# Patient Record
Sex: FEMALE | URBAN - METROPOLITAN AREA
[De-identification: names, ages, dates, MRNs, and addresses within clinical notes are randomized per-mention and may not be internally consistent; named-entity substitution may affect disease eponyms.]

---

## 2018-07-19 ENCOUNTER — APPOINTMENT (OUTPATIENT)
Age: 28
Setting detail: DERMATOLOGY
End: 2018-07-19

## 2018-07-19 DIAGNOSIS — L23.9 ALLERGIC CONTACT DERMATITIS, UNSPECIFIED CAUSE: ICD-10-CM

## 2018-07-19 DIAGNOSIS — B86 SCABIES: ICD-10-CM

## 2018-07-19 PROCEDURE — OTHER ADDITIONAL NOTES: OTHER

## 2018-07-19 PROCEDURE — OTHER COUNSELING: OTHER

## 2018-07-19 PROCEDURE — 99203 OFFICE O/P NEW LOW 30 MIN: CPT

## 2018-07-19 PROCEDURE — OTHER PRESCRIPTION MEDICATION MANAGEMENT: OTHER

## 2018-07-19 PROCEDURE — OTHER PRESCRIPTION: OTHER

## 2018-07-19 RX ORDER — BETAMETHASONE DIPROPIONATE 0.5 MG/G
OINTMENT TOPICAL BID
Qty: 50 | Refills: 0 | Status: ERX | COMMUNITY
Start: 2018-07-19

## 2018-07-19 RX ORDER — PERMETHRIN 50 MG/G
CREAM TOPICAL QD
Qty: 1 | Refills: 0 | Status: ERX

## 2018-07-19 RX ORDER — IVERMECTIN 3 MG/1
TABLET ORAL QD
Qty: 10 | Refills: 0 | Status: ERX

## 2018-07-19 ASSESSMENT — LOCATION SIMPLE DESCRIPTION DERM
LOCATION SIMPLE: RIGHT FOREARM
LOCATION SIMPLE: RIGHT THIGH
LOCATION SIMPLE: ABDOMEN
LOCATION SIMPLE: LEFT BUTTOCK
LOCATION SIMPLE: LEFT UPPER ARM
LOCATION SIMPLE: LEFT POSTERIOR THIGH

## 2018-07-19 ASSESSMENT — LOCATION DETAILED DESCRIPTION DERM
LOCATION DETAILED: LEFT LATERAL ABDOMEN
LOCATION DETAILED: LEFT PROXIMAL POSTERIOR THIGH
LOCATION DETAILED: RIGHT VENTRAL PROXIMAL FOREARM
LOCATION DETAILED: RIGHT ANTERIOR DISTAL THIGH
LOCATION DETAILED: LEFT ANTECUBITAL SKIN
LOCATION DETAILED: LEFT LATERAL BUTTOCK

## 2018-07-19 ASSESSMENT — LOCATION ZONE DERM
LOCATION ZONE: LEG
LOCATION ZONE: ARM
LOCATION ZONE: TRUNK

## 2018-07-19 NOTE — PROCEDURE: ADDITIONAL NOTES
Additional Notes: Permethrin Cream prescribed again as the patient’s second application was too early.  We will also prescribe a weight appropriate one time dose of ivermectin as well.

## 2018-07-19 NOTE — PROCEDURE: PRESCRIPTION MEDICATION MANAGEMENT
Initiate Treatment: Augmented Betamethasone
Detail Level: Zone
Initiate Treatment: Permethrin Cream \\nIvermectin

## 2019-02-18 ENCOUNTER — OFFICE VISIT (OUTPATIENT)
Dept: PODIATRY CLINIC | Facility: CLINIC | Age: 29
End: 2019-02-18
Payer: COMMERCIAL

## 2019-02-18 VITALS — RESPIRATION RATE: 22 BRPM | HEART RATE: 60 BPM | SYSTOLIC BLOOD PRESSURE: 109 MMHG | DIASTOLIC BLOOD PRESSURE: 58 MMHG

## 2019-02-18 DIAGNOSIS — M79.671 PAIN IN RIGHT FOOT: ICD-10-CM

## 2019-02-18 DIAGNOSIS — M20.11 HALLUX VALGUS OF RIGHT FOOT: ICD-10-CM

## 2019-02-18 DIAGNOSIS — D36.13 NEUROMA OF FOOT: Primary | ICD-10-CM

## 2019-02-18 PROCEDURE — 99213 OFFICE O/P EST LOW 20 MIN: CPT | Performed by: PODIATRIST

## 2019-02-18 PROCEDURE — 64455 NJX AA&/STRD PLTR COM DG NRV: CPT | Performed by: PODIATRIST

## 2019-02-18 RX ORDER — ETONOGESTREL/ETHINYL ESTRADIOL .12-.015MG
RING, VAGINAL VAGINAL
Refills: 5 | COMMUNITY
Start: 2019-02-08 | End: 2020-06-02

## 2019-02-18 RX ORDER — TRIAMCINOLONE ACETONIDE 40 MG/ML
20 INJECTION, SUSPENSION INTRA-ARTICULAR; INTRAMUSCULAR ONCE
Status: COMPLETED | OUTPATIENT
Start: 2019-02-18 | End: 2019-02-18

## 2019-02-18 RX ADMIN — TRIAMCINOLONE ACETONIDE 20 MG: 40 INJECTION, SUSPENSION INTRA-ARTICULAR; INTRAMUSCULAR at 15:39:00

## 2019-02-18 NOTE — PROGRESS NOTES
Per dr Jose Carlos Iqbal request was draw 1 syringe with 0.5ml Kenalog 40 mg and 0.5 ml Marcaine 0.5% for this patient right foot. Hannah Gr

## 2019-02-18 NOTE — PROGRESS NOTES
Precious Chaudhary is a 33year old female. Patient presents with:   Foot Pain: Right - onset about 1 week ago - no injury - has pain on the medial aspect of the foot rated as 4-7/10 at all the time, had a little numbness one day -         HPI:   This very plea Left arm)   Pulse 60   Resp 22   Physical Exam  GENERAL: well developed, well nourished, in no apparent distress  EXTREMITIES:   1.  Integument: The skin on both feet was examined it is warm, dry and supple nails are of normal thickness and appearance there DPLISE  2/18/2019

## 2019-03-06 ENCOUNTER — TELEPHONE (OUTPATIENT)
Dept: PODIATRY CLINIC | Facility: CLINIC | Age: 29
End: 2019-03-06

## 2019-03-06 NOTE — TELEPHONE ENCOUNTER
Made appt for pt w/ [facility] on 3/8 @ 235pm. N- do you have access to pt's US results? She states she had done at [address] and was not given a disc/report.

## 2019-03-06 NOTE — TELEPHONE ENCOUNTER
Dr. Magnus Olszewski is requesting a follow up with pt at [facility] location in regards to tests. Left VM for pt to call back and schedule appt. Please advise pt per Dr. Magnus Olszewski, APPT MUST BE MADE FOR Friday 3/8/19.      If no openings, please use RN slot at 2:3

## 2019-03-08 ENCOUNTER — OFFICE VISIT (OUTPATIENT)
Dept: PODIATRY CLINIC | Facility: CLINIC | Age: 29
End: 2019-03-08
Payer: COMMERCIAL

## 2019-03-08 VITALS — DIASTOLIC BLOOD PRESSURE: 70 MMHG | SYSTOLIC BLOOD PRESSURE: 119 MMHG | RESPIRATION RATE: 20 BRPM | HEART RATE: 76 BPM

## 2019-03-08 DIAGNOSIS — G57.91 NEURITIS OF FOOT, RIGHT: Primary | ICD-10-CM

## 2019-03-08 PROCEDURE — 64455 NJX AA&/STRD PLTR COM DG NRV: CPT | Performed by: PODIATRIST

## 2019-03-08 RX ORDER — TRIAMCINOLONE ACETONIDE 40 MG/ML
20 INJECTION, SUSPENSION INTRA-ARTICULAR; INTRAMUSCULAR ONCE
Status: COMPLETED | OUTPATIENT
Start: 2019-03-08 | End: 2019-03-08

## 2019-03-08 RX ADMIN — TRIAMCINOLONE ACETONIDE 20 MG: 40 INJECTION, SUSPENSION INTRA-ARTICULAR; INTRAMUSCULAR at 15:19:00

## 2019-03-08 NOTE — PROGRESS NOTES
Per Dr Haven Glynn request was draw 1 syringe with 0.5 ml Kenalog 40 mg and 0.5 ml Marcaine 0.5% for this patient right foot. Hannah Gr

## 2019-03-11 NOTE — PROGRESS NOTES
Courtney Zavala is a 33year old female. Patient presents with:   Foot Pain: Right f/u - had cortisone inj on 2/18/19 - had US done and is here to discuss results - states she had a little relief from injection - still has pain rated as 5-6/10 on and off Location: Right arm)   Pulse 76   Resp 20   Physical Exam  GENERAL: well developed, well nourished, in no apparent distress  EXTREMITIES:   1. Integument:  It was examined there is still's a little bit of edema along the medial plantar first metatarsal head

## 2019-04-03 ENCOUNTER — OFFICE VISIT (OUTPATIENT)
Dept: PODIATRY CLINIC | Facility: CLINIC | Age: 29
End: 2019-04-03
Payer: COMMERCIAL

## 2019-04-03 VITALS — HEART RATE: 78 BPM | RESPIRATION RATE: 18 BRPM | SYSTOLIC BLOOD PRESSURE: 112 MMHG | DIASTOLIC BLOOD PRESSURE: 66 MMHG

## 2019-04-03 DIAGNOSIS — M20.11 HALLUX VALGUS OF RIGHT FOOT: ICD-10-CM

## 2019-04-03 DIAGNOSIS — M79.671 PAIN IN RIGHT FOOT: ICD-10-CM

## 2019-04-03 DIAGNOSIS — Q66.70 PES CAVUS: ICD-10-CM

## 2019-04-03 DIAGNOSIS — M25.80 SESAMOIDITIS: ICD-10-CM

## 2019-04-03 DIAGNOSIS — G57.91 NEURITIS OF FOOT, RIGHT: Primary | ICD-10-CM

## 2019-04-04 NOTE — PROGRESS NOTES
Lilliam Thayer is a 33year old female. Patient presents with:   Foot Pain: Right f/u - had a cortisone inj on 3/8/19 - states she got some relief for a little bit but then pain got worse and is rated as 3-6/10 at all the time         HPI:   Right [facility] st weightbearing but on weightbearing she does not have a clinical bunion present. 2. Vascular: Patient has palpable pulses   3. Neurologic: The patient has intact sensorium   4.  Musculoskeletal: The pain that she experiences shooting pain that goes into he

## 2019-04-06 ENCOUNTER — TELEPHONE (OUTPATIENT)
Dept: PODIATRY CLINIC | Facility: CLINIC | Age: 29
End: 2019-04-06

## 2019-04-06 DIAGNOSIS — M79.2 NEURALGIA OF RIGHT FOOT: ICD-10-CM

## 2019-04-06 DIAGNOSIS — G57.91 NEURITIS OF FOOT, RIGHT: Primary | ICD-10-CM

## 2019-04-06 NOTE — TELEPHONE ENCOUNTER
Per dr Susan Jc request a new note for excuse from athletics was put in for this patient.  Hannah Gr

## 2019-04-09 NOTE — TELEPHONE ENCOUNTER
Spoke to pt and informed her of CAM boot for [facility]. Gave pt [facility] phone # and informed her that I will fax order to that location. Informed pt that, per [facility], to wear CAM boot for 1 mth.      Dr. Lucia Doran, pt is asking what to do after wearing boot f

## 2019-04-10 NOTE — TELEPHONE ENCOUNTER
Pt. States that [facility] still has not received order from our office for boot. Pt. Would like to p/up boot today.

## 2019-04-11 ENCOUNTER — TELEPHONE (OUTPATIENT)
Dept: PODIATRY CLINIC | Facility: CLINIC | Age: 29
End: 2019-04-11

## 2019-04-11 NOTE — TELEPHONE ENCOUNTER
Re-faxed DME order again - 3rd try - to [facility]. Confirmation of faxed received. LM on pt's preferred # that fax was re-sent to [facility] and confirmation of fax was received.

## 2019-04-11 NOTE — TELEPHONE ENCOUNTER
Spoke to Ramakrishna at [facility] in [facility]. Ramakrishna states they need authorized referral. Informed Karol that pt has [facility] and does not need a referral. Informed her that what they received is an order. Karol asking for pt's demographic info.  Informed her t

## 2019-04-15 ENCOUNTER — TELEPHONE (OUTPATIENT)
Dept: PODIATRY CLINIC | Facility: CLINIC | Age: 29
End: 2019-04-15

## 2019-04-15 NOTE — TELEPHONE ENCOUNTER
Call placed to [address] [place] [address] in [facility] and spoke with Christie Mcdonald regarding the need to change the CAM boot dispensed to this patient - they dispensed a short CAM boot instead of a Tall CAM boot as per prescription- she stated the patient called already a

## 2019-05-06 ENCOUNTER — PATIENT MESSAGE (OUTPATIENT)
Dept: PODIATRY CLINIC | Facility: CLINIC | Age: 29
End: 2019-05-06

## 2019-05-06 DIAGNOSIS — M77.51 CAPSULITIS OF METATARSOPHALANGEAL (MTP) JOINT OF RIGHT FOOT: Primary | ICD-10-CM

## 2019-05-06 DIAGNOSIS — M25.80 SESAMOIDITIS: ICD-10-CM

## 2019-05-06 NOTE — TELEPHONE ENCOUNTER
Please make this patient a letter to Emily alvarado stating that she cannot participate in athletic activities and training classes for another 5-week.   Thank you

## 2019-05-06 NOTE — TELEPHONE ENCOUNTER
From: Annalisa Mendes  To: Katelin Toledo DPM  Sent: 5/6/2019 1:17 PM CDT  Subject: Non-Urgent Medical Question    Hi, can I please have an updated doctor's note. Thank you!

## 2019-05-16 ENCOUNTER — OFFICE VISIT (OUTPATIENT)
Dept: PODIATRY CLINIC | Facility: CLINIC | Age: 29
End: 2019-05-16
Payer: COMMERCIAL

## 2019-05-16 ENCOUNTER — HOSPITAL ENCOUNTER (OUTPATIENT)
Dept: GENERAL RADIOLOGY | Age: 29
Discharge: HOME | End: 2019-05-16
Attending: PODIATRIST
Payer: COMMERCIAL

## 2019-05-16 ENCOUNTER — TELEPHONE (OUTPATIENT)
Dept: PODIATRY CLINIC | Facility: CLINIC | Age: 29
End: 2019-05-16

## 2019-05-16 DIAGNOSIS — M87.00 AVN (AVASCULAR NECROSIS OF BONE) (HCC): ICD-10-CM

## 2019-05-16 DIAGNOSIS — M20.11 HALLUX VALGUS OF RIGHT FOOT: ICD-10-CM

## 2019-05-16 DIAGNOSIS — M79.2 NEURALGIA OF RIGHT FOOT: ICD-10-CM

## 2019-05-16 DIAGNOSIS — M25.80 SESAMOIDITIS: ICD-10-CM

## 2019-05-16 DIAGNOSIS — D36.13 NEUROMA OF FOOT: ICD-10-CM

## 2019-05-16 PROCEDURE — 73630 X-RAY EXAM OF FOOT: CPT | Performed by: PODIATRIST

## 2019-05-16 NOTE — TELEPHONE ENCOUNTER
Dr Esau Keller ordered pt a MRI right foot with and without contrast today at pt's office visit. Pt has [employer] and will need a PA. Informed pt of this and that this may take up to 5 - 10 business days.  Informed pt that we will call her on her cell # [phone]

## 2019-05-16 NOTE — PROGRESS NOTES
Ignacio Coleman is a 33year old female. Patient presents with: Foot Pain: right -- States foot medial side of foot had bruising and pain this week. States foot has numbness in forefoot while driving. Rates pain 3/10 and describes this as discomfort.  Bay Mcdonald visit.  Physical Exam  GENERAL: well developed, well nourished, in no apparent distress  EXTREMITIES:   1. Integument: The skin on her right foot was examined it is warm dry supple. 2. Vascular: Patient has palpable pulses on the right foot   3.  Neurolog

## 2019-05-21 NOTE — TELEPHONE ENCOUNTER
Pt authorization number:  [id number]  Authorization approval dates: 5/21/2019 until 7/5/2019  Informed Pt. of PA decision with exp. date of approved procedure:[phone] (M)  LM informing pt that testing approved by insurance and, provided phone number

## 2019-05-24 ENCOUNTER — HOSPITAL ENCOUNTER (OUTPATIENT)
Dept: MRI IMAGING | Facility: HOSPITAL | Age: 29
Discharge: HOME | End: 2019-05-24
Attending: PODIATRIST
Payer: COMMERCIAL

## 2019-05-24 DIAGNOSIS — M87.00 AVN (AVASCULAR NECROSIS OF BONE) (HCC): ICD-10-CM

## 2019-05-24 PROCEDURE — 73720 MRI LWR EXTREMITY W/O&W/DYE: CPT | Performed by: PODIATRIST

## 2019-05-24 PROCEDURE — A9575 INJ GADOTERATE MEGLUMI 0.1ML: HCPCS | Performed by: PODIATRIST

## 2019-05-31 ENCOUNTER — TELEPHONE (OUTPATIENT)
Dept: PODIATRY CLINIC | Facility: CLINIC | Age: 29
End: 2019-05-31

## 2019-05-31 NOTE — TELEPHONE ENCOUNTER
Results were already released on [facility] and should be visible to pt. Called pt LMOM that results already released and to CB if she is still having an issue.

## 2019-05-31 NOTE — TELEPHONE ENCOUNTER
Pt calling states she has already looked at the results on MyChart and already has the MRI disk. Pt asking to get a copy of the radiology report written by the raidiologist, not the report given by .  Pls advise, thank you

## 2019-06-03 NOTE — TELEPHONE ENCOUNTER
S/w pt and she was unsure if the MRI report was dictated by [facility] or a radiologist. I confirmed the MRI was read and dictated by a radiologist. She already has the results via [facility]. She had no further concerns.

## 2019-06-03 NOTE — TELEPHONE ENCOUNTER
Please release the MRI results to the patient on my chart. Please call her and let her know that they are available. I thought she already had them. Thank you.

## 2020-08-17 ENCOUNTER — TELEPHONE (OUTPATIENT)
Dept: PODIATRY CLINIC | Facility: CLINIC | Age: 30
End: 2020-08-17

## 2020-08-17 RX ORDER — AZITHROMYCIN 250 MG/1
TABLET, FILM COATED ORAL
Qty: 6 TABLET | Refills: 0 | Status: SHIPPED | OUTPATIENT
Start: 2020-08-17 | End: 2020-08-22

## 2020-08-17 NOTE — TELEPHONE ENCOUNTER
I have been out with this patient and her family and she showed me her toe so I E scribed an antibiotic for her.

## 2020-08-20 ENCOUNTER — OFFICE VISIT (OUTPATIENT)
Dept: PODIATRY CLINIC | Facility: CLINIC | Age: 30
End: 2020-08-20
Payer: COMMERCIAL

## 2020-08-20 VITALS — HEART RATE: 56 BPM | DIASTOLIC BLOOD PRESSURE: 63 MMHG | SYSTOLIC BLOOD PRESSURE: 102 MMHG

## 2020-08-20 DIAGNOSIS — M79.674 PAIN OF TOE OF RIGHT FOOT: ICD-10-CM

## 2020-08-20 DIAGNOSIS — L60.0 ONYCHOCRYPTOSIS: Primary | ICD-10-CM

## 2020-08-20 DIAGNOSIS — L03.031 PARONYCHIA OF TOE OF RIGHT FOOT: ICD-10-CM

## 2020-08-20 PROCEDURE — 3074F SYST BP LT 130 MM HG: CPT | Performed by: PODIATRIST

## 2020-08-20 PROCEDURE — 3078F DIAST BP <80 MM HG: CPT | Performed by: PODIATRIST

## 2020-08-20 RX ORDER — ONDANSETRON 4 MG/1
TABLET, FILM COATED ORAL
COMMUNITY

## 2020-08-20 RX ORDER — ALBUTEROL SULFATE 90 UG/1
AEROSOL, METERED RESPIRATORY (INHALATION)
COMMUNITY

## 2020-08-20 RX ORDER — AZITHROMYCIN 250 MG/1
TABLET, FILM COATED ORAL
Qty: 6 TABLET | Refills: 0 | Status: SHIPPED | OUTPATIENT
Start: 2020-08-20 | End: 2020-08-25

## 2020-08-20 RX ORDER — SPIRONOLACTONE 50 MG/1
TABLET, FILM COATED ORAL
COMMUNITY

## 2020-08-20 RX ORDER — MINOCYCLINE HYDROCHLORIDE 100 MG/1
100 TABLET ORAL DAILY
COMMUNITY
Start: 2020-08-04

## 2020-08-21 ENCOUNTER — TELEPHONE (OUTPATIENT)
Dept: PODIATRY CLINIC | Facility: CLINIC | Age: 30
End: 2020-08-21

## 2020-08-21 NOTE — TELEPHONE ENCOUNTER
Called pt and went over post ingrown toenail care instructions with her. She verbalized understanding.

## 2020-08-21 NOTE — TELEPHONE ENCOUNTER
Per pt asking if there is any after care instructions after getting nail trimmed yesterday? Please advise thank you.

## 2020-08-22 ENCOUNTER — TELEPHONE (OUTPATIENT)
Dept: PODIATRY CLINIC | Facility: CLINIC | Age: 30
End: 2020-08-22

## 2020-08-22 NOTE — TELEPHONE ENCOUNTER
Per pt was supposed to receive rx on Thursday, but pharmacy never received.  Pt aware clinical staff will not be in until Monday, please advise

## 2020-08-23 NOTE — PROGRESS NOTES
Nas Sal is a 35year old female. Patient presents with:  Procedure: correction of ingrown toenail right great toe         HPI:   This patient presents to the clinic with a chief complaint of a painful ingrown toenail on her right great toe.   I hav Years of education: Not on file      Highest education level: Not on file    Tobacco Use      Smoking status: Never Smoker      Smokeless tobacco: Never Used    Substance and Sexual Activity      Alcohol use: Yes        Comment: occasional      Drug use: timeout was taken. The right hallux was anesthetized with 5 cc of 0.5% Marcaine plain then prepped and draped using usual aseptic technique. Hemostasis was achieved at the base of the toe with a toe tourniquet.   The lateral nail border of the right hallu

## 2020-08-24 NOTE — TELEPHONE ENCOUNTER
S/w pt and she states her pharmacy never notified her that the zpack was ready for p/u. I confirmed that it was sent on 8/27 at 5:02pm and have a confirmation. She will call pharm.    Made f/u appt on 8/27 @ 4pm [facility]

## 2020-08-27 ENCOUNTER — OFFICE VISIT (OUTPATIENT)
Dept: PODIATRY CLINIC | Facility: CLINIC | Age: 30
End: 2020-08-27
Payer: COMMERCIAL

## 2020-08-27 VITALS — BODY MASS INDEX: 20.86 KG/M2 | HEIGHT: 71 IN | WEIGHT: 149 LBS

## 2020-08-27 DIAGNOSIS — Z98.890 POST-OPERATIVE STATE: Primary | ICD-10-CM

## 2020-08-27 PROCEDURE — 99212 OFFICE O/P EST SF 10 MIN: CPT | Performed by: PODIATRIST

## 2020-08-27 PROCEDURE — 99024 POSTOP FOLLOW-UP VISIT: CPT | Performed by: PODIATRIST

## 2020-08-27 PROCEDURE — 3008F BODY MASS INDEX DOCD: CPT | Performed by: PODIATRIST

## 2020-08-29 NOTE — PROGRESS NOTES
Hailey Lezama is a 35year old female. Patient presents with: Follow - Up: 1 wk f/u correction of ingrown toenail right great toe. denies any swelling, fevers/chills. some soreness.  Rates pain a 1/10         HPI:   Patient returns the clinic minimal di Smokeless tobacco: Never Used    Substance and Sexual Activity      Alcohol use: Yes        Comment: occasional      Drug use: No          REVIEW OF SYSTEMS:   Today reviewed systens as documented below  GENERAL HEALTH: feels well otherwise  SKIN: Refer to

## 2024-11-30 NOTE — ED INITIAL ASSESSMENT (HPI)
Pt c/o cough, sore throat, hoarse voice x1 week.  States chest tightness with coughing, SOB with activity.  No fever.  States 24 weeks pregnant.

## 2024-11-30 NOTE — DISCHARGE INSTRUCTIONS
The strep test, COVID test, influenza test were all negative.  The test we did to look at your heart, the EKG, troponin, and D-dimer were also negative at this time.  You have a mildly elevated white blood cell count.  Patient to follow-up with your obstetrician in 4 days as previously scheduled.  Also schedule a follow-up with your primary care provider for reevaluation of your viral syndrome symptoms.  Follow the recommendations of your obstetrician regarding over-the-counter medications to manage your viral symptoms at this time.  Drink lots of fluids, rest is much as possible.  If any increase in difficulty breathing, chest pain, fever, or other concerns as we discussed return here right away or go to the emergency department for reevaluation.  Thank you for choosing our [facility] for your medical condition today. Please be sure to follow up with your primary care provider in 2 days if no improvement, or as directed. If any worsening of your symptoms or other concerns call your primary care provider, return here or go to the emergency department.

## 2024-11-30 NOTE — ED PROVIDER NOTES
Patient Seen in: [facility]      History   No chief complaint on file.    Stated Complaint: Cough; Sore Throat    Subjective:   This is a 34-year-old  female presents to immediate care with a chief complaint of a sore throat, fatigue, hoarse voice, chest tightness, and a cough for approximately 1 week.  Patient admits to the chest tightness and feeling of difficulty breathing with exercise such as carrying her children walking upstairs.  Patient states that she has had to use albuterol in the past approximately 10 years ago she started using this off with a diagnosis of pneumonia.  Since then she has had episodes of what she describes as exercise-induced asthma and has used the albuterol as needed.  Her last use was approximately 4 years ago.  Patient denies any specific or localized chest pain other than a general feeling of tightness and the mid sternal area.  The patient denies any fever, chills, earache, nausea, vomiting, or diarrhea.  She is a  4 para 2, she is 24 weeks pregnant with an EDC of 2025.  She has had 1 miscarriage in the past.  Patient denies any abdominal or back pain, she states the baby has been as active as usual, denies any abnormal vaginal bleeding.    The history is provided by the patient.                Objective:     History reviewed. No pertinent past medical history.           Past Surgical History:   Procedure Laterality Date    Foot surgery  2019                Social History     Socioeconomic History    Marital status:    Tobacco Use    Smoking status: Never     Passive exposure: Never    Smokeless tobacco: Never   Vaping Use    Vaping status: Never Used   Substance and Sexual Activity    Alcohol use: Not Currently     Comment: occasional    Drug use: Not Currently   Social History Narrative    ** Merged History Encounter **          Social Drivers of Health     Food Insecurity: Low Risk  (3/14/2023)    Received from [facility]  [facility]    Food [facility]     Have there been times that your food ran out, and you didn't have money to get more?: No     Are there times that you worry that this might happen?: No   Transportation Needs: Low Risk  (3/14/2023)    Received from [facility]    Transportation Needs     Do you have trouble getting transportation to medical appointments?: No              Review of Systems   Constitutional:  Negative for activity change, appetite change, chills, diaphoresis, fatigue and fever.   HENT:  Positive for sore throat and voice change. Negative for congestion, sinus pressure and sinus pain.    Eyes:  Negative for pain.   Respiratory:  Positive for cough, chest tightness and shortness of breath. Negative for wheezing and stridor.    Cardiovascular:  Negative for chest pain, palpitations and leg swelling.   Gastrointestinal:  Negative for abdominal pain, diarrhea, nausea and vomiting.   Genitourinary:  Negative for difficulty urinating and vaginal bleeding.   Musculoskeletal:  Negative for myalgias and neck pain.   Skin:  Negative for rash.   Neurological:  Negative for light-headedness.       Positive for stated complaint: Cough; Sore Throat  Other systems are as noted in HPI.  Constitutional and vital signs reviewed.      All other systems reviewed and negative except as noted above.    Physical Exam     ED Triage Vitals [11/30/24 0812]   /61   Pulse 78   Resp 16   Temp 98.6 °F (37 °C)   Temp src    SpO2 100 %   O2 Device None (Room air)       Current Vitals:   Vital Signs  BP: 114/61  Pulse: 78  Resp: 16  Temp: 98.6 °F (37 °C)    Oxygen Therapy  SpO2: 100 %  O2 Device: None (Room air)        Physical Exam  Vitals and nursing note reviewed.   Constitutional:       General: She is not in acute distress.     Appearance: Normal appearance. She is normal weight. She is not ill-appearing or toxic-appearing.   HENT:      Head: Normocephalic.      Right Ear: Tympanic membrane and  ear canal normal.      Left Ear: Tympanic membrane and ear canal normal.      Nose: Nose normal. No congestion or rhinorrhea.      Mouth/Throat:      Mouth: Mucous membranes are moist.      Pharynx: Oropharynx is clear. Posterior oropharyngeal erythema (Minimal postpharyngeal erythema) present. No oropharyngeal exudate.   Eyes:      General:         Right eye: No discharge.         Left eye: No discharge.      Conjunctiva/sclera: Conjunctivae normal.   Cardiovascular:      Rate and Rhythm: Normal rate and regular rhythm.      Heart sounds: Normal heart sounds. No murmur heard.  Pulmonary:      Effort: Pulmonary effort is normal. No respiratory distress.      Breath sounds: Normal breath sounds. No wheezing, rhonchi or rales.   Chest:      Chest wall: No tenderness.   Abdominal:      Comments: Gravid   Musculoskeletal:      Cervical back: Neck supple.   Lymphadenopathy:      Cervical: No cervical adenopathy.   Skin:     General: Skin is warm and dry.      Findings: No rash.   Neurological:      General: No focal deficit present.      Mental Status: She is alert and oriented to person, place, and time.      Motor: No weakness.      Gait: Gait normal.   Psychiatric:         Mood and Affect: Mood normal.         Behavior: Behavior normal.         Thought Content: Thought content normal.         Judgment: Judgment normal.             ED Course     Labs Reviewed   POCT CBC - Abnormal; Notable for the following components:       Result Value    WBC IC 12.5 (*)     HGB IC 11.7 (*)     # Neutrophil 10.4 (*)     All other components within normal limits   D-DIMER (POC) - Normal   POCT RAPID STREP - Normal   ISTAT TROPONIN - Normal     Dr. Ly consulted and discussed the patient's presentation and course in immediate care.  D-dimer results and CBC results as well as COVID, influenza and strep test reviewed.  Will obtain EKG and troponin.  If these are within normal limits will discharge with detailed instructions for  reevaluation and follow-up.  Discussed getting a chest x-ray at this time with the patient.  Discussed pros and cons of the x-ray including radiation to fetus at 24 weeks despite shielding the abdomen.  Patient agrees to monitor at home for worsening signs and symptoms, fever, difficulty breathing, worsening chest tightness or pain, other concerns and she agrees to return here or go directly to the ER if those should develop.  Discussed that a chest x-ray to rule out pneumonia could be obtained at that time, she agrees with this plan.    Troponin and EKG are within normal limits patient given detailed verbal discharge instructions, and she states she has access to MyChart at home.  EKG    Rate, intervals and axes as noted on EKG Report.  Rate: 69  Rhythm: Sinus Rhythm  Reading: Normal sinus rhythm without acute ST or T wave changes                     MDM             Medical Decision Making  Differential diagnoses: Strep pharyngitis, COVID, influenza, other viral syndrome, pneumonia, viral upper respiratory infection with cough, pulmonary embolism, pericarditis, exacerbation of previous exercise-induced asthma.  Cormorbidities adding complexity: Patient is currently 24 weeks pregnant, history of miscarriage x 1, history of exercise-induced asthma treated with albuterol as needed, history of pneumonia in the past.  Discussions of management done with: Dr. Ly  My independent interpretation of studies: Rapid COVID-negative rapid strep negative rapid influenza also negative.  EKG is normal sinus rhythm without acute ST changes, D-dimer and troponin within normal limits.  Diagnostic tests and meds considered but not ordered: Chest x-ray discussed with patient regarding pros and cons patient declines chest x-ray at this time but agrees to return if symptoms worsen or are persistent for chest x-ray to rule out pneumonia or other acute process.  Social determinants of health affecting care: None noted  Shared decision  making done by: Dr. Ly, the patient, and myself.        Disposition and Plan     Clinical Impression:  1. Viral upper respiratory tract infection with cough         Disposition:  Discharge  11/30/2024  9:56 am    Follow-up:  No follow-up provider specified.        Medications Prescribed:  Discharge Medication List as of 11/30/2024 10:07 AM              Supplementary Documentation:

## (undated) NOTE — LETTER
3/8/2019          To Whom It May Concern:    Charlotte Chauhan is currently under my medical care and may not return to athletics for 4 weeks. If you require additional information please contact our office.         Sincerely,    Michelle Paniagua DPM

## (undated) NOTE — LETTER
AUTHORIZATION FOR SURGICAL OPERATION OR OTHER PROCEDURE    1.  I hereby authorize Dr. Adeline Quinones, and [facility] staff assigned to my case to perform the following operation and/or procedure at the [facility]:    ______________correction of ingr Time:  ________ A. M.  P.M.        Patient Name:  ______________________________________________________  (please print)      Patient signature:  ___________________________________________________             Relationship to Patient:

## (undated) NOTE — LETTER
4/6/2019          To Whom It May Concern:    Christie Wagner is currently under my medical care and may not return to any athletics for the next 4 weeks. If you require additional information please contact our office.         Sincerely,    Noé Segal No

## (undated) NOTE — LETTER
5/16/2019          To Whom It May Concern:    Ignacio Coleman is currently under my medical care. She is to wear gym shoes to work. If you require additional information please contact our office.         Sincerely,    True Lesch, DPM

## (undated) NOTE — LETTER
5/7/2019          To Whom It May Concern:    Antwan Mckeon is currently under my medical care. She cannot participate in athletic activities and training classes for another 5-weeks. If you require additional information please contact our office.